# Patient Record
Sex: MALE | Race: WHITE
[De-identification: names, ages, dates, MRNs, and addresses within clinical notes are randomized per-mention and may not be internally consistent; named-entity substitution may affect disease eponyms.]

---

## 2018-05-25 ENCOUNTER — HOSPITAL ENCOUNTER (EMERGENCY)
Dept: HOSPITAL 25 - ED | Age: 63
Discharge: HOME | End: 2018-05-25
Payer: COMMERCIAL

## 2018-05-25 VITALS — SYSTOLIC BLOOD PRESSURE: 133 MMHG | DIASTOLIC BLOOD PRESSURE: 67 MMHG
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
## 2018-05-25 DIAGNOSIS — R42: ICD-10-CM
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
## 2018-05-25 DIAGNOSIS — S09.90XA: Primary | ICD-10-CM

## 2018-05-25 DIAGNOSIS — S00.83XA: ICD-10-CM

## 2018-05-25 DIAGNOSIS — S01.311A: ICD-10-CM

## 2018-05-25 PROCEDURE — 90471 IMMUNIZATION ADMIN: CPT

## 2018-05-25 PROCEDURE — 12001 RPR S/N/AX/GEN/TRNK 2.5CM/<: CPT

## 2018-05-25 PROCEDURE — 96374 THER/PROPH/DIAG INJ IV PUSH: CPT

## 2018-05-25 PROCEDURE — 99283 EMERGENCY DEPT VISIT LOW MDM: CPT

## 2018-05-25 PROCEDURE — 70486 CT MAXILLOFACIAL W/O DYE: CPT

## 2018-05-25 PROCEDURE — 90715 TDAP VACCINE 7 YRS/> IM: CPT

## 2018-05-25 PROCEDURE — 70450 CT HEAD/BRAIN W/O DYE: CPT

## 2018-05-25 NOTE — RAD
INDICATION: Head and face injury.



COMPARISON: Head CT of the same date.



TECHNIQUE: Multidetector CT base of the skull through mandible without contrast.

Multiplanar reformation.



REPORT: Artifact from dental amalgam.



Soft tissue swelling at the chin. No loculated soft tissue plane hematoma evident.



The orbital and maxillary sinus margins, zygomatic arches, lamina papyracea,  base of the

maxilla, pterygoid plates, and nasal bones are intact.  The mandible is intact.  Normal

temporal mandibular joint alignment. 



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

visualized through the C5-C6 level.



Normally aerated paranasal sinuses and mastoid air spaces.



IMPRESSION: Negative for maxillofacial fracture. Soft tissue swelling at the chin. No

loculated soft tissue plane hematoma evident.

## 2018-05-25 NOTE — RAD
Indication: Head injury.



Comparison: No relevant prior exams available on the [facility] PACS for comparison.



Technique: Noncontrast CT vertex of skull through foramen magnum.



Report: The sulci, ventricles, and basal cisterns are normal for age. Gray matter white

matter differentiation is preserved without evidence for edema. No intra or extra axial

hemorrhage is detected. Unremarkable visualized orbital contents. Negative for calvarial

or skull base fracture. Negative for scalp hematoma. The visualized paranasal sinuses and

mastoid air spaces are clear.



IMPRESSION:  No CT evidence for traumatic brain injury.

## 2018-05-25 NOTE — ED
Head Injury





- HPI Summary


HPI Summary: 


63-year-old male presents with head injury and right ear laceration today.  He 

states that he went to get onto the mower and he tripped and fell and landed on 

the floor on his right side.  He has abrasion noted to his right cheek.  His 

laceration of his right ear through the earlobe.  He is unsure of his last 

tetanus.  He denies any neck pain.  He denies any other injury.  He denies loss 

conscious.  He was very dizzy but that has since resolved.  No nausea no 

vomiting.  He is not on blood thinners.  He denies any change in vision.  He 

denies any blurry vision.  No photophobia.  The area is not actively bleeding.








- History Of Current Complaint


Chief Complaint: EDFacialInjury


Stated Complaint: FALL/HEAD LAC


Time Seen by Provider: 05/25/18 16:02


Pain Intensity: 3





- Allergies/Home Medications


Allergies/Adverse Reactions: 


 Allergies











Allergy/AdvReac Type Severity Reaction Status Date / Time


 


No Known Allergies Allergy   Verified 05/25/18 16:00














PMH/Surg Hx/FS Hx/Imm Hx


Endocrine/Hematology History: 


   Denies: Hx Diabetes


Cardiovascular History: 


   Denies: Hx Congestive Heart Failure


 History: 


   Denies: Hx Renal Disease





- Immunization History


Date of Tetanus Vaccine: unknown


Infectious Disease History: No


Infectious Disease History: 


   Denies: Traveled Outside the [place] in Last 30 Days





- Family History


Known Family History: Positive: Hypertension





- Social History


Alcohol Use: Occasionally


Substance Use Type: Reports: None


Hx Tobacco Use: Yes


Smoking Status (MU): Never Smoked Tobacco





Review of Systems


Negative: Fever


Negative: Chest Pain


Negative: Shortness Of Breath


Negative: Vomiting, Nausea


Positive: Other - right ear laceration


Positive: Headache


All Other Systems Reviewed And Are Negative: Yes





Physical Exam


Triage Information Reviewed: Yes


Vital Signs On Initial Exam: 


 Initial Vitals











Temp Pulse Resp BP Pulse Ox


 


 96.9 F   90   18   125/92   98 


 


 05/25/18 15:56  05/25/18 15:56  05/25/18 15:56  05/25/18 15:56  05/25/18 15:56











Vital Signs Reviewed: Yes


Appearance: Positive: Well-Appearing


Skin: Positive: Warm, Dry, Other - 1cm through and through ear laceration of 

right ear, abrasion right side of face


Head/Face: Positive: Normal Head/Face Inspection, Other - no step off, racoon 

eyes, henao sign


Eyes: Positive: Normal, EOMI, SHAYY, Conjunctiva Clear


ENT: Positive: Normal ENT inspection, Pharynx normal, TMs normal


Respiratory/Lung Sounds: Positive: Clear to Auscultation, Breath Sounds Present


Cardiovascular: Positive: Normal, RRR


Musculoskeletal: Positive: Normal


Neurological: Positive: Sensory/Motor Intact, Alert, Oriented to Person Place, 

Time, CN Intact II-III


Psychiatric: Positive: Normal





- [place] Coma Scale


Best Eye Response: 4 - Spontaneous


Best Motor Response: 6 - Obeys Commands


Best Verbal Response: 5 - Oriented


Coma Scale Total: 15





Procedures





- Laceration/Wound Repair


  ** 1


Location: Other - right ear laceration


Description: Linear


Anesthesia: Local, 1.0%


Length, Depth and Shape: 1cm through and through


Irrigated w/ Saline (ccs): 200


Laceration/Wound Explored: no foreign body removed


Closure: Single Layer


Number of Sutures: 6


Layer Closure?: No


Sterile Dressing Applied?: No





Diagnostics





- Vital Signs


 Vital Signs











  Temp Pulse Resp BP Pulse Ox


 


 05/25/18 15:56  96.9 F  90  18  125/92  98














- Laboratory


Lab Statement: Any lab studies that have been ordered have been reviewed, and 

results considered in the medical decision making process.





- CT


  ** brain


CT Interpretation: No Acute Changes


CT Interpretation Completed By: Radiologist





  ** maxillaryfacial


CT Interpretation: No Acute Changes


CT Interpretation Completed By: Radiologist





Head Injury Course/Dx


Course Of Treatment: 63-year-old male presents with head injury and right ear 

laceration today.  He states that he went to get onto the mower and he tripped 

and fell and landed on the floor on his right side.  He has abrasion noted to 

his right cheek.  His laceration of his right ear through the earlobe.  He is 

unsure of his last tetanus.  He denies any neck pain.  He denies any other 

injury.  He denies loss conscious.  He was very dizzy but that has since 

resolved.  No nausea no vomiting.  He is not on blood thinners.  He denies any 

change in vision.  He denies any blurry vision.  No photophobia.  The area is 

not actively bleeding.  On exam has 1 cm through and through laceration of the 

right ear.  Neurovascular intact.  He has abrasion noted to right face.  CT 

brain and maxillary facial normal.  Cleaned the ear and placed 6 sutures.  Will 

place on Keflex.  Patient understands agrees with plan.  





- Diagnoses


Differential Diagnosis/HQI/PQRI: Concussion Without LOC, Contusion, Laceration, 

Orbital Fracture


Provider Diagnoses: 


 Head injury, Ear lobe laceration, Contusion of face








Discharge





- Sign-Out/Discharge


Documenting (check all that apply): Discharge/Admit/Transfer





- Discharge Plan


Condition: Good


Disposition: HOME


Prescriptions: 


Cephalexin CAP* [Keflex CAP*] 500 mg PO BID #9 cap


Patient Education Materials:  Care For Your Stitches (ED), Head Injury (ED)


Referrals: 


Lona Boggs MD [Primary Care Provider] - 


Additional Instructions: 


Place ice on area as needed


Keep laceration dry for next 24 hours


Take Tylenol for headache every 6 hours


suture removal in 5 days


Take antibiotic twice a day for 5 days


Follow up with primary within 5 days


Return to ED if develop any new or worsening symptoms





- Billing Disposition and Condition


Condition: GOOD


Disposition: HOME